# Patient Record
Sex: FEMALE | Race: WHITE | NOT HISPANIC OR LATINO | Employment: FULL TIME | URBAN - METROPOLITAN AREA
[De-identification: names, ages, dates, MRNs, and addresses within clinical notes are randomized per-mention and may not be internally consistent; named-entity substitution may affect disease eponyms.]

---

## 2021-04-16 ENCOUNTER — BULK ORDERING (OUTPATIENT)
Dept: CASE MANAGEMENT | Facility: OTHER | Age: 36
End: 2021-04-16

## 2021-04-16 DIAGNOSIS — Z23 IMMUNIZATION DUE: ICD-10-CM

## 2021-05-10 ENCOUNTER — TREATMENT (OUTPATIENT)
Dept: PHYSICAL THERAPY | Facility: CLINIC | Age: 36
End: 2021-05-10

## 2021-05-10 DIAGNOSIS — M25.559 PAIN, HIP: ICD-10-CM

## 2021-05-10 DIAGNOSIS — G89.29 CHRONIC BILATERAL LOW BACK PAIN WITHOUT SCIATICA: ICD-10-CM

## 2021-05-10 DIAGNOSIS — M79.18 MYALGIA OF PELVIC FLOOR: Primary | ICD-10-CM

## 2021-05-10 DIAGNOSIS — N39.46 MIXED INCONTINENCE: ICD-10-CM

## 2021-05-10 DIAGNOSIS — M54.50 CHRONIC BILATERAL LOW BACK PAIN WITHOUT SCIATICA: ICD-10-CM

## 2021-05-10 PROCEDURE — 97530 THERAPEUTIC ACTIVITIES: CPT | Performed by: PHYSICAL THERAPIST

## 2021-05-10 PROCEDURE — 97162 PT EVAL MOD COMPLEX 30 MIN: CPT | Performed by: PHYSICAL THERAPIST
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
## 2021-05-17 ENCOUNTER — TREATMENT (OUTPATIENT)
Dept: PHYSICAL THERAPY | Facility: CLINIC | Age: 36
End: 2021-05-17

## 2021-05-17 DIAGNOSIS — G89.29 CHRONIC BILATERAL LOW BACK PAIN WITHOUT SCIATICA: ICD-10-CM

## 2021-05-17 DIAGNOSIS — N39.46 MIXED INCONTINENCE: ICD-10-CM

## 2021-05-17 DIAGNOSIS — M79.18 MYALGIA OF PELVIC FLOOR: Primary | ICD-10-CM

## 2021-05-17 DIAGNOSIS — M25.559 PAIN, HIP: ICD-10-CM

## 2021-05-17 DIAGNOSIS — M54.50 CHRONIC BILATERAL LOW BACK PAIN WITHOUT SCIATICA: ICD-10-CM

## 2021-05-17 PROCEDURE — 97140 MANUAL THERAPY 1/> REGIONS: CPT | Performed by: PHYSICAL THERAPIST

## 2021-05-17 PROCEDURE — 97032 APPL MODALITY 1+ESTIM EA 15: CPT | Performed by: PHYSICAL THERAPIST

## 2021-05-17 PROCEDURE — 97112 NEUROMUSCULAR REEDUCATION: CPT | Performed by: PHYSICAL THERAPIST

## 2021-05-17 PROCEDURE — 97110 THERAPEUTIC EXERCISES: CPT | Performed by: PHYSICAL THERAPIST

## 2021-05-17 NOTE — PROGRESS NOTES
Physical Therapy Daily Progress Note      Patient: Anjali Silva   : 1985  Referring practitioner: Usha Cardoso*  Date of Initial Visit: Type: THERAPY  Noted: 5/10/2021  Today's Date: 2021  Patient seen for 2 sessions    Progress Note Due: 2021     Anjali Silva reports: that she was fine the day after the eval, but sore the following day.       Objective/ Treatment: Mild tenderness of B superficial pelvic floor muscles. Moderate-tenderness of B erector spinae and glute medius with increased resting activity of the left spinal extensors. Min-moderate verbal cues and demo provided for new exercises. Emphasis on minimizing valsalva and coordinating breath with pelvic floor elongation. See manual therapy, exercise, and modality logs for more details.     Education: Updated home exercise program   Lower Trunk Rotations - 1 x daily - 7 x weekly - 1 sets - 30 reps  Standing Anti-Rotation Press with Anchored Resistance - 1 x daily - 7 x weekly - 2 sets - 15 reps  Wall Bunkie - 1 x daily - 7 x weekly - 1 sets - 15 reps - 3 sec hold  Seated Cat Camel - 1 x daily - 7 x weekly - 1 sets - 15 reps - 3 sec hold      Assessment: The patient tolerated today's second therapy session well. Initiated manual therapy to decrease muscle over activity within the pelvic floor and lumbar spine. Progressed lumbopelvic mobility exercises and trialed IFC electrical stimulation and moist heat for pain relief.         Plan:  Progress per Plan of Care             Timed:         Manual Therapy:  19       mins  30334;     Therapeutic Exercise:   8      mins  26582;     Neuromuscular Carolina:  8      mins  97161;    Therapeutic Activity:          mins  70608;     Gait Training:           mins  30143;     Ultrasound:          mins  42928;    Ionto                                   mins   30244  Self Care                            mins   20225      Un-Timed:  Electrical Stimulation:   20      mins  39792 ( );  Dry  Needling          mins self-pay  Traction          mins 15830  Low Eval          Mins  61883  Mod Eval          Mins  40097  High Eval                            Mins  52462  Canalith Repos                   mins  98602    Timed Treatment:  55    mins   Total Treatment:    57    mins    Janna Jorge, PT  Physical Therapist

## 2021-05-21 ENCOUNTER — TREATMENT (OUTPATIENT)
Dept: PHYSICAL THERAPY | Facility: CLINIC | Age: 36
End: 2021-05-21

## 2021-05-21 DIAGNOSIS — M25.559 PAIN, HIP: ICD-10-CM

## 2021-05-21 DIAGNOSIS — M54.50 CHRONIC BILATERAL LOW BACK PAIN WITHOUT SCIATICA: ICD-10-CM

## 2021-05-21 DIAGNOSIS — M79.18 MYALGIA OF PELVIC FLOOR: Primary | ICD-10-CM

## 2021-05-21 DIAGNOSIS — G89.29 CHRONIC BILATERAL LOW BACK PAIN WITHOUT SCIATICA: ICD-10-CM

## 2021-05-21 DIAGNOSIS — N39.46 MIXED INCONTINENCE: ICD-10-CM

## 2021-05-21 PROCEDURE — 97032 APPL MODALITY 1+ESTIM EA 15: CPT | Performed by: PHYSICAL THERAPIST

## 2021-05-21 PROCEDURE — 97112 NEUROMUSCULAR REEDUCATION: CPT | Performed by: PHYSICAL THERAPIST

## 2021-05-21 PROCEDURE — 97140 MANUAL THERAPY 1/> REGIONS: CPT | Performed by: PHYSICAL THERAPIST

## 2021-05-21 PROCEDURE — 97110 THERAPEUTIC EXERCISES: CPT | Performed by: PHYSICAL THERAPIST

## 2021-05-21 NOTE — PROGRESS NOTES
Physical Therapy Daily Progress Note      Patient: Anjali Silva   : 1985  Referring practitioner: Usha Cardoso*  Date of Initial Visit: Type: THERAPY  Noted: 5/10/2021  Today's Date: 2021  Patient seen for 3 sessions    Progress Note Due: 2021     Anjali Silva reports: that she was sore after last session but it was a good hurt. Shonto that the heat and stim were beneficial. Pelvic clocks difficult to perform counter clockwise.       Objective/ Treatment: Mild discomfort and hypertonicity of the superficial transverse perineal musculature. Small area of bruising on left buttock, which was avoided today. Hypertonicity of the left erector spinae compared to the right. Minimal verbal cues for addition of new exercises to maintain neutral pelvic positions. See manual therapy and exercise logs for more details.      Education: Updated home exercise program with handouts     Exercises  Quadruped Full Range Thoracic Rotation with Reach - 1 x daily - 7 x weekly - 1 sets - 10 reps  Deep Squat with Pelvic Floor Relaxation - 1 x daily - 7 x weekly - 1 sets - 1 min hold      Assessment: The patient tolerated today's session well with minimal complaints of pain. Progressed pelvic floor down training, lumbar mobility and rotational strengthening to decrease pelvic and back pain. Plan to progress lumbopelvic stability exercises as pain allows.         Plan:  Progress per Plan of Care             Timed:         Manual Therapy:   20      mins  79643;     Therapeutic Exercise:   8      mins  07931;     Neuromuscular Carolina: 17       mins  92977;    Therapeutic Activity:          mins  94974;     Gait Training:           mins  79747;     Ultrasound:          mins  05149;    Ionto                                   mins   79224  Self Care                            mins   41831      Un-Timed:  Electrical Stimulation:   20      mins  71859 ( );  Dry Needling          mins self-pay  Traction           mins 11483  Low Eval          Mins  37110  Mod Eval          Mins  01961  High Eval                            Mins  12108  Canalith Repos                   mins  80059    Timed Treatment:  65    mins   Total Treatment:    70   mins    Janna Jorge, PT  Physical Therapist

## 2021-05-24 ENCOUNTER — TREATMENT (OUTPATIENT)
Dept: PHYSICAL THERAPY | Facility: CLINIC | Age: 36
End: 2021-05-24

## 2021-05-24 DIAGNOSIS — M54.50 CHRONIC BILATERAL LOW BACK PAIN WITHOUT SCIATICA: ICD-10-CM

## 2021-05-24 DIAGNOSIS — N39.46 MIXED INCONTINENCE: ICD-10-CM

## 2021-05-24 DIAGNOSIS — G89.29 CHRONIC BILATERAL LOW BACK PAIN WITHOUT SCIATICA: ICD-10-CM

## 2021-05-24 DIAGNOSIS — M25.559 PAIN, HIP: ICD-10-CM

## 2021-05-24 DIAGNOSIS — M79.18 MYALGIA OF PELVIC FLOOR: Primary | ICD-10-CM

## 2021-05-24 PROCEDURE — 97014 ELECTRIC STIMULATION THERAPY: CPT | Performed by: PHYSICAL THERAPIST

## 2021-05-24 PROCEDURE — 97112 NEUROMUSCULAR REEDUCATION: CPT | Performed by: PHYSICAL THERAPIST

## 2021-05-24 PROCEDURE — 97140 MANUAL THERAPY 1/> REGIONS: CPT | Performed by: PHYSICAL THERAPIST

## 2021-05-24 PROCEDURE — 97110 THERAPEUTIC EXERCISES: CPT | Performed by: PHYSICAL THERAPIST

## 2021-05-24 NOTE — PROGRESS NOTES
Physical Therapy Daily Progress Note      Patient: Anjali Silva   : 1985  Referring practitioner: No ref. provider found  Date of Initial Visit: Type: THERAPY  Noted: 5/10/2021  Today's Date: 2021  Patient seen for 4 sessions    Progress Note Due: 2021     Anjali Silva reports: that she worked at a different location and noticed that she was leaning over to the side a lot reaching for the hair bowls. Notices that since she's started physical therapy hasn't noticed her shoulder hurting as much when she reaches.       Objective/ Treatment:   The patient verbally provided ongoing and enthusiastic consent for the internal pelvic floor assessment and treatment conducting during today's physical therapy session.    Moderate discomfort with palpation of superficial pelvic floor muscles. Overactivity diminished towards the end of manual intervention. Hypertonicity of the left erector spinae compared to the right. Minimal verbal cues provided for addition of new exercises. Patient rotating well through the spine with minimal verbal/tactile cues. See manual therapy, exercise, and treatment logs for more details.      Education: Updated home exercise program       Assessment: The patient tolerated today's session well. Pelvic floor hypertonicity is slowly decreasing. Progressing spinal mobility exercises and planning to continue lumbar strengthening.          Plan:  Progress per Plan of Care             Timed:         Manual Therapy:  18       mins  95781;     Therapeutic Exercise:   10      mins  99807;     Neuromuscular Carolina: 17       mins  87086;    Therapeutic Activity:          mins  69542;     Gait Training:           mins  14392;     Ultrasound:          mins  10782;    Ionto                                   mins   35259  Self Care                            mins   98305      Un-Timed:  Electrical Stimulation:    20     mins  66532 ( );  Dry Needling          mins self-pay  Traction           mins 74790  Low Eval          Mins  66997  Mod Eval          Mins  93835  High Eval                            Mins  29584  Canalith Repos                   mins  65723    Timed Treatment:  65    mins   Total Treatment:     65   mins    Janna Jorge, PT  Physical Therapist

## 2021-05-26 ENCOUNTER — TREATMENT (OUTPATIENT)
Dept: PHYSICAL THERAPY | Facility: CLINIC | Age: 36
End: 2021-05-26

## 2021-05-26 DIAGNOSIS — M79.18 MYALGIA OF PELVIC FLOOR: Primary | ICD-10-CM

## 2021-05-26 DIAGNOSIS — G89.29 CHRONIC BILATERAL LOW BACK PAIN WITHOUT SCIATICA: ICD-10-CM

## 2021-05-26 DIAGNOSIS — M54.50 CHRONIC BILATERAL LOW BACK PAIN WITHOUT SCIATICA: ICD-10-CM

## 2021-05-26 DIAGNOSIS — M25.559 PAIN, HIP: ICD-10-CM

## 2021-05-26 DIAGNOSIS — N39.46 MIXED INCONTINENCE: ICD-10-CM

## 2021-05-26 PROCEDURE — 97140 MANUAL THERAPY 1/> REGIONS: CPT | Performed by: PHYSICAL THERAPIST

## 2021-05-26 PROCEDURE — 97112 NEUROMUSCULAR REEDUCATION: CPT | Performed by: PHYSICAL THERAPIST

## 2021-05-26 PROCEDURE — 97032 APPL MODALITY 1+ESTIM EA 15: CPT | Performed by: PHYSICAL THERAPIST

## 2021-05-26 PROCEDURE — 97110 THERAPEUTIC EXERCISES: CPT | Performed by: PHYSICAL THERAPIST

## 2021-05-26 NOTE — PROGRESS NOTES
Physical Therapy Daily Progress Note      Patient: Anjali Silva   : 1985  Referring practitioner: Usha Cardoso*  Date of Initial Visit: Type: THERAPY  Noted: 5/10/2021  Today's Date: 2021  Patient seen for 5 sessions    Progress Note Due: 2021     Anjali Silva reports: that she was sore after she left, but is feeling better now. Feels like she's still been straining with bowel movements and not emptying her bladder all the way.     Current Pain: 4/10  Worst Pain: 8-10/10 (low back while lifting/carrying)        Subjective Questionnaire: Pelvic Floor Impact Questionnaire (raw scores):  Bladder or Urine:  = 57.1  Bowel or Rectum:  = 52.3  Vagina or Pelvis:  = 52.3      Objective/ Treatment:    Palpation   Left   Hypertonic in the erector spinae.   Tenderness of the erector spinae, gluteus guera, gluteus medius, iliacus, iliopsoas and quadratus lumborum.      Right   Hypertonic in the erector spinae. Tenderness of the erector spinae, gluteus guera, gluteus medius, iliacus, iliopsoas and quadratus lumborum.      Left Hip Palpation Comments  Quadratus lumborum: L>R.     Additional Palpation Details  Moderate tenderness: Bilateral pectineus   Mild tenderness: Superficial transverse perineal, left bulbocavernosus      Tenderness      Lumbar Spine  Tenderness in the facet joint.      Left Hip   Tenderness in the PSIS and sacroiliac joint.      Right Hip   Tenderness in the PSIS and sacroiliac joint.     Additional Tenderness Details  Moderate tenderness: R L4-5, L L1-2     Active Range of Motion      Lumbar   Flexion: WFL  Extension: WFL with pain  Left lateral flexion: WFL  Right lateral flexion: 30 degrees   Left rotation: WFL  Right rotation: WFL       Strength/Myotome Testing      Left Hip   Planes of Motion   Extension: 4+  Abduction: 5  External rotation: 4+  Internal rotation: 4+     Isolated Muscles   Gluteus guera: 4+  Gluteus minimus: 4+     Right Hip   Planes of  Motion   Extension: 4+  Abduction: 5  External rotation: 5  Internal rotation: 5     Isolated Muscles   Gluteus maximums: 4+  Gluteus minimus: 4+    Additional Strength Details  The patient verbally provided ongoing and enthusiastic consent for the internal pelvic floor assessment and treatment conducting during today's physical therapy session.  Pelvic Floor Muscle Strength: 3/5  Pelvic Floor Muscle Endurance: 10 seconds  Pelvic Floor Muscle Power: 5 contractions in 10 seconds  Pelvic Floor Relaxation: Delayed, but present      Muscle Activation   Patient unable to activate left transverse abdominals and right transverse abdominals.      Tests      Lumbar  Left   Positive quadrant.      Left Pelvic Girdle/Sacrum   Positive: sacral spring.        Right Pelvic Girdle/Sacrum   Positive: sacral spring.       Additional Tests Details  Evidence of grade 1 pelvic organ prolapse      Education: Progress towards goals      Short Term Goals: 4 weeks  1. The patient will be compliant with home exercise program with minimal verbal and/or tactile cues. -achieved   2. The patient will deny restriction with lifting/carrying light objects at work such as shampoo. -progressing, 3/10   3. The patient will be independent with appropriate defecation pattern and be able to elongate the pelvic floor fully. -progressing, nearly achieved          Long Term Goals: 12 weeks   1. The patient will be independent with long term home exercise program. -progressing  2. The patient will perform prolonged standing during a work shift with < 2/10 low back and hip pain. -progressing 8/10  3. The patient will deny pelvic pressure during recreational resistance training and yoga. -progressing   4. The patient will report a 75% decrease in episodes of incontinence.  -progressing   5. Pelvic Floor Impact Questionnaire items to decrease to < 50 to return to physical activities, social activities and household chores without limitation. -progressing,  nearly achieved     Assessment: The patient has attended 5 sessions of physical therapy thus far. Compliance has been excellent. Upon re-assessment today, the patient exhibits improvements in right sided pelvic floor muscles tone and glute minimus strength. Subjective outcome measure has improved in all categories. She continues to exhibit overactivity of the left sided pelvic floor musculature and erector spinae contributing to back pain with carrying items and standing for prolonged periods at work and pelvic pain with gynecological exams, mixed incontinence and straining with bowel movements. She would benefit from additional skilled physical therapy to address the stated deficits and return to her previous level of function.           Plan:  Progress per Plan of Care             Timed:         Manual Therapy:   18      mins  59700;     Therapeutic Exercise:    11     mins  47445;     Neuromuscular Carolina:  11      mins  64380;    Therapeutic Activity:          mins  48437;     Gait Training:           mins  56807;     Ultrasound:          mins  32582;    Ionto                                   mins   17789  Self Care                            mins   58771      Un-Timed:  Electrical Stimulation:   20      mins  14993 (MC );  Dry Needling          mins self-pay  Traction          mins 59852  Low Eval          Mins  68518  Mod Eval          Mins  15105  High Eval                            Mins  65641  Canalith Repos                   mins  06922    Timed Treatment:  60    mins   Total Treatment:    62    mins    Janna Jorge, PT  Physical Therapist

## 2021-06-02 ENCOUNTER — TREATMENT (OUTPATIENT)
Dept: PHYSICAL THERAPY | Facility: CLINIC | Age: 36
End: 2021-06-02

## 2021-06-02 DIAGNOSIS — M25.559 PAIN, HIP: ICD-10-CM

## 2021-06-02 DIAGNOSIS — G89.29 CHRONIC BILATERAL LOW BACK PAIN WITHOUT SCIATICA: ICD-10-CM

## 2021-06-02 DIAGNOSIS — M54.50 CHRONIC BILATERAL LOW BACK PAIN WITHOUT SCIATICA: ICD-10-CM

## 2021-06-02 DIAGNOSIS — M79.18 MYALGIA OF PELVIC FLOOR: Primary | ICD-10-CM

## 2021-06-02 DIAGNOSIS — N39.46 MIXED INCONTINENCE: ICD-10-CM

## 2021-06-02 PROCEDURE — 97112 NEUROMUSCULAR REEDUCATION: CPT | Performed by: PHYSICAL THERAPIST

## 2021-06-02 PROCEDURE — 97032 APPL MODALITY 1+ESTIM EA 15: CPT | Performed by: PHYSICAL THERAPIST

## 2021-06-02 PROCEDURE — 97140 MANUAL THERAPY 1/> REGIONS: CPT | Performed by: PHYSICAL THERAPIST

## 2021-06-02 NOTE — PROGRESS NOTES
Physical Therapy Daily Progress Note      Patient: Anjali Silva   : 1985  Referring practitioner: Usha Cardoso*  Date of Initial Visit: Type: THERAPY  Noted: 5/10/2021  Today's Date: 2021  Patient seen for 6 sessions    Progress Note Due: 2021     Anjali Silva reports: that she rode bikes all day Monday and now she's really sore.       Objective/ Treatment: Mild discomfort with palpation of left superficial pelvic floor muscles. The erector spinae and gluteals are tender bilaterally, but less muscle overactivity noted compared to previous visits. The patient self employs breathing strategies during exercises without cues. Verbal/tactile cues to maintain appropriate posterior pelvic tilt in standing. See manual therapy, exercise and modalities logs for more details.     Education: Updated home exercise program with handouts      Assessment: Continuing to progress abdominal activation and neuromuscular in standing. The patient exhibits difficulty maintaining posterior pelvic tilt, likely due to abdominal weakness. Plan to progress to dynamic strengthening in standing once patient masters activation in static positions.         Plan:  Progress per Plan of Care             Timed:         Manual Therapy:  14      mins  85421;     Therapeutic Exercise:         mins  61770;     Neuromuscular Carolina:  31      mins  12710;    Therapeutic Activity:          mins  03759;     Gait Training:           mins  99206;     Ultrasound:          mins  89224;    Ionto                                   mins   62847  Self Care                            mins   19529      Un-Timed:  Electrical Stimulation:   20      mins  96571 ( );  Dry Needling          mins self-pay  Traction          mins 96728  Low Eval          Mins  10419  Mod Eval          Mins  86322  High Eval                            Mins  20827  Canalith Repos                   mins  04195    Timed Treatment:  65    mins   Total Treatment:     66    mins    Janna Jorge, PT  Physical Therapist

## 2021-06-04 ENCOUNTER — TREATMENT (OUTPATIENT)
Dept: PHYSICAL THERAPY | Facility: CLINIC | Age: 36
End: 2021-06-04

## 2021-06-04 DIAGNOSIS — M25.559 PAIN, HIP: ICD-10-CM

## 2021-06-04 DIAGNOSIS — G89.29 CHRONIC BILATERAL LOW BACK PAIN WITHOUT SCIATICA: ICD-10-CM

## 2021-06-04 DIAGNOSIS — N39.46 MIXED INCONTINENCE: ICD-10-CM

## 2021-06-04 DIAGNOSIS — M54.50 CHRONIC BILATERAL LOW BACK PAIN WITHOUT SCIATICA: ICD-10-CM

## 2021-06-04 DIAGNOSIS — M79.18 MYALGIA OF PELVIC FLOOR: Primary | ICD-10-CM

## 2021-06-04 PROCEDURE — 97112 NEUROMUSCULAR REEDUCATION: CPT | Performed by: PHYSICAL THERAPIST

## 2021-06-04 PROCEDURE — 97140 MANUAL THERAPY 1/> REGIONS: CPT | Performed by: PHYSICAL THERAPIST

## 2021-06-04 PROCEDURE — 97014 ELECTRIC STIMULATION THERAPY: CPT | Performed by: PHYSICAL THERAPIST

## 2021-06-04 NOTE — PROGRESS NOTES
Physical Therapy Daily Progress Note      Patient: Anjali Silva   : 1985  Referring practitioner: Usha Cardoso*  Date of Initial Visit: Type: THERAPY  Noted: 5/10/2021  Today's Date: 2021  Patient seen for 7 sessions    Progress Note Due: 2021     Anjali Silva reports: that she's been trying to be more conscious of standing with a tilt at work and it helps.       Objective/ Treatment: Mild discomfort with palpation of left superficial pelvic floor muscles. The erector spinae and gluteals are tender bilaterally, but still decreased muscle tone compared to last session. She is able to achieve good thoracic motion with wall rosetta exercise. Minimal verbal cues to maintain posterior pelvic tilt. See manual therapy, modality, and exercise logs for more details.     Education: Demo on how to perform deadlift motion with PPT      Assessment: The patient was able to progress resistance associated with rotational spinal strengthening. Today's session was slightly shorter than normal, as the patient had to be at work earlier. As a result, not all exercises were performed. Plan to progress dynamic standing lumbopelvic strengthening next session.        Plan:  Progress strengthening /stabilization /functional activity             Timed:         Manual Therapy:   16      mins  14201;     Therapeutic Exercise:         mins  27966;     Neuromuscular Carolina:  16      mins  13694;    Therapeutic Activity:    2      mins  49910;     Gait Training:           mins  58012;     Ultrasound:          mins  98933;    Ionto                                   mins   03875  Self Care                            mins   44635      Un-Timed:  Electrical Stimulation:   10      mins  18075 ( );  Dry Needling          mins self-pay  Traction          mins 59020  Low Eval          Mins  17830  Mod Eval          Mins  92765  High Eval                            Mins  55078  Canalith Repos                   mins   53437    Timed Treatment:  44    mins   Total Treatment:    45    mins    Janna Jorge, PT  Physical Therapist

## 2021-06-07 ENCOUNTER — TREATMENT (OUTPATIENT)
Dept: PHYSICAL THERAPY | Facility: CLINIC | Age: 36
End: 2021-06-07

## 2021-06-07 DIAGNOSIS — M54.50 CHRONIC BILATERAL LOW BACK PAIN WITHOUT SCIATICA: ICD-10-CM

## 2021-06-07 DIAGNOSIS — N39.46 MIXED INCONTINENCE: ICD-10-CM

## 2021-06-07 DIAGNOSIS — G89.29 CHRONIC BILATERAL LOW BACK PAIN WITHOUT SCIATICA: ICD-10-CM

## 2021-06-07 DIAGNOSIS — M79.18 MYALGIA OF PELVIC FLOOR: Primary | ICD-10-CM

## 2021-06-07 DIAGNOSIS — M25.559 PAIN, HIP: ICD-10-CM

## 2021-06-07 PROCEDURE — 97112 NEUROMUSCULAR REEDUCATION: CPT | Performed by: PHYSICAL THERAPIST

## 2021-06-07 PROCEDURE — 97140 MANUAL THERAPY 1/> REGIONS: CPT | Performed by: PHYSICAL THERAPIST

## 2021-06-07 PROCEDURE — 97032 APPL MODALITY 1+ESTIM EA 15: CPT | Performed by: PHYSICAL THERAPIST

## 2021-06-07 NOTE — PROGRESS NOTES
Physical Therapy Daily Progress Note      Patient: Anjali Silva   : 1985  Referring practitioner: No ref. provider found  Date of Initial Visit: Type: THERAPY  Noted: 5/10/2021  Today's Date: 2021  Patient seen for 8 sessions    Progress Note Due: 2021     Anjali Silva reports: that she went bike riding Saturday. Butt is sore. Feels like she is able to empty her bladder better now. Still experiences pelvic pressure but hasn't noticed leaking.       Objective/ Treatment: Mild discomfort with palpation of left sided pelvic floor musculature. Overall, pelvic floor muscle tone is decreasing. The posterior buttocks were moderate-severely tender today bilaterally L>R. Moderate verbal cues provided for progression of exercises including maintaining posterior pelvic tilt in mini lunge positions. Cues to minimize genu valgum. Patient performed exercises in front of mirror for feedback. See manual therapy and exercise logs for more details.     Education: Updated HEP      Assessment: Progressed lumbopelvic stabilization exercises today to more dynamic positions. 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         Plan:  Progress strengthening /stabilization /functional activity             Timed:         Manual Therapy:  16       mins  85461;     Therapeutic Exercise:         mins  01370;     Neuromuscular Carolina:  13      mins  68516;    Therapeutic Activity:    8      mins  78526;     Gait Training:           mins  11134;     Ultrasound:          mins  60554;    Ionto                                   mins   58002  Self Care                            mins   62372      Un-Timed:  Electrical Stimulation:  10       mins  29438 ( );  Dry Needling          mins  self-pay  Traction          mins 72885  Low Eval          Mins  61762  Mod Eval          Mins  18841  High Eval                            Mins  17601  Canalith Repos                   mins  10208    Timed Treatment:  47    mins   Total Treatment:    49    mins    Janna Jorge, PT  Physical Therapist

## 2021-06-09 ENCOUNTER — TREATMENT (OUTPATIENT)
Dept: PHYSICAL THERAPY | Facility: CLINIC | Age: 36
End: 2021-06-09

## 2021-06-09 DIAGNOSIS — M25.559 PAIN, HIP: ICD-10-CM

## 2021-06-09 DIAGNOSIS — G89.29 CHRONIC BILATERAL LOW BACK PAIN WITHOUT SCIATICA: ICD-10-CM

## 2021-06-09 DIAGNOSIS — N39.46 MIXED INCONTINENCE: ICD-10-CM

## 2021-06-09 DIAGNOSIS — M79.18 MYALGIA OF PELVIC FLOOR: Primary | ICD-10-CM

## 2021-06-09 DIAGNOSIS — M54.50 CHRONIC BILATERAL LOW BACK PAIN WITHOUT SCIATICA: ICD-10-CM

## 2021-06-09 PROCEDURE — 97140 MANUAL THERAPY 1/> REGIONS: CPT | Performed by: PHYSICAL THERAPIST

## 2021-06-09 PROCEDURE — 97032 APPL MODALITY 1+ESTIM EA 15: CPT | Performed by: PHYSICAL THERAPIST

## 2021-06-09 PROCEDURE — 97530 THERAPEUTIC ACTIVITIES: CPT | Performed by: PHYSICAL THERAPIST

## 2021-06-09 PROCEDURE — 97110 THERAPEUTIC EXERCISES: CPT | Performed by: PHYSICAL THERAPIST

## 2021-06-09 NOTE — PROGRESS NOTES
Physical Therapy Daily Progress Note      Patient: Anjali Silva   : 1985  Referring practitioner: No ref. provider found  Date of Initial Visit: Type: THERAPY  Noted: 5/10/2021  Today's Date: 2021  Patient seen for 9 sessions    Progress Note Due: 2021     Anjali Silva reports: that her back was fine but she had some pain in her pelvis. Went for a run the other day and tried to keep her pelvis level. Feels like it is also easier to lay on her stomach.       Objective/ Treatment:  The patient verbally provided ongoing and enthusiastic consent for the internal pelvic floor assessment and treatment conducting during today's physical therapy session.    Mild discomfort of left pelvic floor musculature - slightly decreased from previous visits. The QL and erector spinae are hypertonic and moderately-severely painful to palpation. Provided minimal verbal cues for correct lunge position including stance. Moderate verbal cues for squat form including minimizing excessive trunk flexion. See manual therapy, exercise, and modality logs for more details.     Education: Benefit of dry needling, proper squat form       Assessment: The patient exhibited slightly less discomfort with pelvic floor soft tissue mobilization. We are progressing her lumbopelvic strengthening to minimize back pain. The patient exhibits excessive forward trunk flexion during squat. The patient benefits from verbal cues to correct, although with difficulty. Discussed the benefits of dry needling. Plan to schedule dry needling appointment and continue with dynamic lumbopelvic strengthening.         Plan:  Progress per Plan of Care             Timed:         Manual Therapy:   15      mins  43508;     Therapeutic Exercise:   18      mins  92101;     Neuromuscular Carolina:        mins  18129;    Therapeutic Activity:    13      mins  15298;     Gait Training:           mins  93012;     Ultrasound:          mins  57927;    Ionto                                    mins   11606  Self Care                            mins   41342      Un-Timed:  Electrical Stimulation:   10      mins  98643 ( );  Dry Needling          mins self-pay  Traction          mins 06636  Low Eval          Mins  59902  Mod Eval          Mins  22642  High Eval                            Mins  58782  Canalith Repos                   mins  22079    Timed Treatment:  56    mins   Total Treatment:    58    mins    Janna Jorge, PT  Physical Therapist

## 2021-06-16 ENCOUNTER — TREATMENT (OUTPATIENT)
Dept: PHYSICAL THERAPY | Facility: CLINIC | Age: 36
End: 2021-06-16

## 2021-06-16 DIAGNOSIS — G89.29 CHRONIC BILATERAL LOW BACK PAIN WITHOUT SCIATICA: ICD-10-CM

## 2021-06-16 DIAGNOSIS — M25.559 PAIN, HIP: ICD-10-CM

## 2021-06-16 DIAGNOSIS — M54.50 CHRONIC BILATERAL LOW BACK PAIN WITHOUT SCIATICA: ICD-10-CM

## 2021-06-16 DIAGNOSIS — M79.18 MYALGIA OF PELVIC FLOOR: Primary | ICD-10-CM

## 2021-06-16 DIAGNOSIS — N39.46 MIXED INCONTINENCE: ICD-10-CM

## 2021-06-16 PROCEDURE — 97110 THERAPEUTIC EXERCISES: CPT | Performed by: PHYSICAL THERAPIST

## 2021-06-16 PROCEDURE — 97140 MANUAL THERAPY 1/> REGIONS: CPT | Performed by: PHYSICAL THERAPIST

## 2021-06-16 PROCEDURE — 97032 APPL MODALITY 1+ESTIM EA 15: CPT | Performed by: PHYSICAL THERAPIST

## 2021-06-16 PROCEDURE — 97530 THERAPEUTIC ACTIVITIES: CPT | Performed by: PHYSICAL THERAPIST

## 2021-06-16 NOTE — PROGRESS NOTES
Physical Therapy Daily Progress Note      Patient: Anjali Silva   : 1985  Referring practitioner: No ref. provider found  Date of Initial Visit: Type: THERAPY  Noted: 5/10/2021  Today's Date: 2021  Patient seen for 10 sessions    Progress Note Due: 2021     Anjali Silva reports: that she's noticed some sharp pains in the front of her hips. Always assumed it was ovaries, but thinks it might be psoas muscles. Sneezing with coughing and leaking. Bowels have been moving well. Feels like she's more aware of how she should be standing at work, but back is always painful at the end of a shift. Feels like she's had less hip pain with sleeping and less hip pain with running.     Subjective Questionnaire: Pelvic Floor Impact Questionnaire (raw scores):  Bladder or Urine:  = 52.3 .  Bowel or Rectum:  = 52.3  Vagina or Pelvis:  = 66.6       Current Pain: 4/10  Worst Pain: 8/10 (low back while lifting/carrying)        Objective/ Treatment:  Left   Tenderness of the erector spinae, gluteus guera, gluteus medius, iliacus, iliopsoas and quadratus lumborum.      Right   Tenderness of the erector spinae, gluteus guera, gluteus medius, iliacus, iliopsoas and quadratus lumborum.      Additional Palpation Details  Moderate tenderness: Bilateral pectineus, Superficial transverse perineal, bilateral obturator internus (palpated internally)    Tenderness      Lumbar Spine  Tenderness in the facet joint.      Left Hip   Tenderness in the PSIS and sacroiliac joint. L>R     Right Hip   Tenderness in the PSIS and sacroiliac joint. L>R    Additional Tenderness Details  Moderate tenderness: R L4-5, L L1-2     Active Range of Motion      Lumbar   Flexion: WFL  Extension: WFL with mild pain  Left lateral flexion: WFL  Right lateral flexion: WFL with mild pain  Left rotation: WFL  Right rotation: WFL        Strength/Myotome Testing      Left Hip   Planes of Motion   Extension: 4+  Abduction: 5  External  rotation: 4+  Internal rotation: 4+     Isolated Muscles   Gluteus guera: 4+  Gluteus minimus: 4+     Right Hip   Planes of Motion   Extension: 4+  Abduction: 5  External rotation: 5  Internal rotation: 5     Isolated Muscles   Gluteus maximums: 4+  Gluteus minimus: 4+    Additional Strength Details  The patient verbally provided ongoing and enthusiastic consent for the internal pelvic floor assessment and treatment conducting during today's physical therapy session.  Pelvic Floor Muscle Strength: 3/5  Pelvic Floor Muscle Endurance: 15 seconds  Pelvic Floor Muscle Power: 6 contractions in 10 seconds  Pelvic Floor Relaxation: Full      Muscle Activation   The patient is able to activate left transverse abdominals and right transverse abdominals in hooklying.      Tests      Lumbar  Right   Positive quadrant.       Education: Progress towards goals      Short Term Goals: 4 weeks  1. The patient will be compliant with home exercise program with minimal verbal and/or tactile cues. -achieved   2. The patient will deny restriction with lifting/carrying light objects at work such as shampoo. -progressing, 3/10   3. The patient will be independent with appropriate defecation pattern and be able to elongate the pelvic floor fully. -achieved    Long Term Goals: 12 weeks   1. The patient will be independent with long term home exercise program. -progressing  2. The patient will perform prolonged standing during a work shift with < 2/10 low back and hip pain. -progressing 8/10  3. The patient will deny pelvic pressure during recreational resistance training and yoga. -progressing   4. The patient will report a 75% decrease in episodes of incontinence.  -progressing   5. Pelvic Floor Impact Questionnaire items to decrease to < 50 to return to physical activities, social activities and household chores without limitation. -progressing, nearly achieved     Assessment: The patient has attended 10 sessions of skilled physical  therapy thus far. Compliance has been good. Since beginning physical therapy, she exhibits a reduction in left QL and erector spinae muscle tone. Spinal mobility has improved. Pelvic floor muscle endurance has also improved. 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 She would benefit from additional skilled physical therapy in order to address the stated deficits and return to her previous level of function.         Plan:  Progress strengthening /stabilization /functional activity             Timed:         Manual Therapy:  15       mins  68711;     Therapeutic Exercise:    10     mins  14260;     Neuromuscular Carolina:        mins  12589;    Therapeutic Activity:   15       mins  58801;     Gait Training:           mins  50931;     Ultrasound:          mins  63084;    Ionto                                   mins   59538  Self Care                            mins   36290      Un-Timed:  Electrical Stimulation:   15      mins  10138 ( );  Dry Needling          mins self-pay  Traction          mins 49135  Low Eval          Mins  42788  Mod Eval          Mins  83113  High Eval                            Mins  75499  Canalith Repos                   mins  47965    Timed Treatment:  55    mins   Total Treatment:    57    mins    Janna Jorge, PT  Physical Therapist

## 2021-06-17 ENCOUNTER — TREATMENT (OUTPATIENT)
Dept: PHYSICAL THERAPY | Facility: CLINIC | Age: 36
End: 2021-06-17

## 2021-06-17 DIAGNOSIS — M79.18 MYALGIA OF PELVIC FLOOR: Primary | ICD-10-CM

## 2021-06-17 DIAGNOSIS — G89.29 CHRONIC BILATERAL LOW BACK PAIN WITHOUT SCIATICA: ICD-10-CM

## 2021-06-17 DIAGNOSIS — M54.50 CHRONIC BILATERAL LOW BACK PAIN WITHOUT SCIATICA: ICD-10-CM

## 2021-06-17 DIAGNOSIS — N39.46 MIXED INCONTINENCE: ICD-10-CM

## 2021-06-17 PROCEDURE — DRYNDL PR CUSTOM DRY NEEDLING SELF PAY: Performed by: PHYSICAL THERAPIST

## 2021-06-17 NOTE — PROGRESS NOTES
Physical Therapy Daily Progress Note  11 treatments  Subjective     Anjali Shira reports: here for dry needling, currently having low back pain and seeing pelvic health PT.         Objective   See Exercise, Manual, and Modality Logs for complete treatment.     Soft tissue was assessed at (B) glute medius and paraspinals (lumbar). PT noted point tenderness as well as palpable trigger points within the muslce tissue. On this date patient stated that they would like to undergo a dry needling procedure for the soft tissue dysfunction. Patient was educated on the procedure for dry needling and consent waver was signed. Patient was informed of the risks, possible adverse effects, along with the benefits of TDN.     Assessment/Plan     Patient tolerated all treatment well. LTR was seen in all targeted muscle groups. Patient was educated on techniques to reduce pain/ soreness if needed as well as what symptoms can be expected following this procedure. Will continue to follow this patient and treat as needed.        Progress per Plan of Care           Manual Therapy:         mins  09437;  Therapeutic Exercise:         mins  37011;     Neuromuscular Carolina:        mins  54469;    Therapeutic Activity:          mins  59037;     Gait Training:           mins  05838;     Ultrasound:          mins  92687;    Electrical Stimulation:         mins  71608 ( );  Dry Needling     12     mins self-pay  12 min heat following   Timed Treatment:   12   mins   Total Treatment:     24   mins    Cuba Faria PT DPT  Physical Therapist  [place] License # [id number]

## 2021-06-21 ENCOUNTER — TREATMENT (OUTPATIENT)
Dept: PHYSICAL THERAPY | Facility: CLINIC | Age: 36
End: 2021-06-21

## 2021-06-21 DIAGNOSIS — M79.18 MYALGIA OF PELVIC FLOOR: Primary | ICD-10-CM

## 2021-06-21 DIAGNOSIS — N39.46 MIXED INCONTINENCE: ICD-10-CM

## 2021-06-21 DIAGNOSIS — M25.559 PAIN, HIP: ICD-10-CM

## 2021-06-21 DIAGNOSIS — G89.29 CHRONIC BILATERAL LOW BACK PAIN WITHOUT SCIATICA: ICD-10-CM

## 2021-06-21 DIAGNOSIS — M54.50 CHRONIC BILATERAL LOW BACK PAIN WITHOUT SCIATICA: ICD-10-CM

## 2021-06-21 PROCEDURE — 97530 THERAPEUTIC ACTIVITIES: CPT | Performed by: PHYSICAL THERAPIST

## 2021-06-21 PROCEDURE — 97140 MANUAL THERAPY 1/> REGIONS: CPT | Performed by: PHYSICAL THERAPIST

## 2021-06-21 PROCEDURE — 97032 APPL MODALITY 1+ESTIM EA 15: CPT | Performed by: PHYSICAL THERAPIST

## 2021-06-21 NOTE — PROGRESS NOTES
Physical Therapy Daily Progress Note      Patient: Anjali Silva   : 1985  Referring practitioner: Usha Cardoso*  Date of Initial Visit: Type: THERAPY  Noted: 5/10/2021  Today's Date: 2021  Patient seen for 12 sessions    Progress Note Due: 2021     Anjali Silva reports: that needling session went really well. States back is tender but not really painful. Occasionally moves a certain way and feels like she has to freeze because of pain.       Objective/ Treatment:   The patient verbally provided ongoing and enthusiastic consent for the internal pelvic floor assessment and treatment conducting during today's physical therapy session.    No discomfort with palpation of right obturator internus. The left obturator internus is mildly painful to palpation and the superficial transverse perineal muscle is moderately painful to palpation bilaterally. The erector spinae are mildly tender bilaterally and the QL is moderately tender bilaterally, but mostly on the left side. Gluteals/iliact crest is mildly tender to palpation bilaterally. Moderate verbal cues provided to maintain posterior pelvic tilt with squatting with dowel. Minimal verbal cues to correct speed and upright trunk with lunging. See manual therapy and exercise logs for more details.     Education: Updated HEP      Assessment: The patient exhibits slightly less pain with palpation of right obturator internus and erector spinae today. Initiated specific abdominal and multifidus strengthening and progressed neuromuscular control of the pelvis with forward and backwards walking against resistance.          Plan:  Progress strengthening /stabilization /functional activity             Timed:         Manual Therapy:   14      mins  92575;     Therapeutic Exercise:   2      mins  23134;     Neuromuscular Carolina: 10       mins  02768;    Therapeutic Activity:   10       mins  18145;     Gait Training:           mins  85689;      Ultrasound:          mins  85760;    Ionto                                   mins   89907  Self Care                            mins   86150      Un-Timed:  Electrical Stimulation:  15       mins  28554 ( );  Dry Needling          mins self-pay  Traction          mins 12072  Low Eval          Mins  34647  Mod Eval          Mins  40351  High Eval                            Mins  87868  Canalith Repos                   mins  26105    Timed Treatment:      mins   Total Treatment:        mins    Janna Jorge, PT  Physical Therapist

## 2021-06-23 ENCOUNTER — TREATMENT (OUTPATIENT)
Dept: PHYSICAL THERAPY | Facility: CLINIC | Age: 36
End: 2021-06-23

## 2021-06-23 DIAGNOSIS — N39.46 MIXED INCONTINENCE: ICD-10-CM

## 2021-06-23 DIAGNOSIS — M79.18 MYALGIA OF PELVIC FLOOR: Primary | ICD-10-CM

## 2021-06-23 DIAGNOSIS — G89.29 CHRONIC BILATERAL LOW BACK PAIN WITHOUT SCIATICA: ICD-10-CM

## 2021-06-23 DIAGNOSIS — M54.50 CHRONIC BILATERAL LOW BACK PAIN WITHOUT SCIATICA: ICD-10-CM

## 2021-06-23 DIAGNOSIS — M25.559 PAIN, HIP: ICD-10-CM

## 2021-06-23 PROCEDURE — 97530 THERAPEUTIC ACTIVITIES: CPT | Performed by: PHYSICAL THERAPIST

## 2021-06-23 PROCEDURE — 97032 APPL MODALITY 1+ESTIM EA 15: CPT | Performed by: PHYSICAL THERAPIST

## 2021-06-23 PROCEDURE — 97140 MANUAL THERAPY 1/> REGIONS: CPT | Performed by: PHYSICAL THERAPIST

## 2021-06-23 NOTE — PROGRESS NOTES
Physical Therapy Daily Progress Note      Patient: Anjali Silva   : 1985  Referring practitioner: Usha Cardoso*  Date of Initial Visit: Type: THERAPY  Noted: 5/10/2021  Today's Date: 2021  Patient seen for 13 sessions    Progress Note Due: 2021     Anjali Silva reports: that she was sore after last session.       Objective/ Treatment:  The patient verbally provided ongoing and enthusiastic consent for the internal pelvic floor assessment and treatment conducting during today's physical therapy session.    Primary discomfort with palpation of superior left ischiocavernosus and left OI. The erector spinae are not painful, but the QL is painful (L>R). Patient reports ~5/10 pain with left QL palpation. Moderate verbal cues to maintain PPT during dynamic exercises and for appropriate speed. See manual therapy and exercise logs for more details.     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 She is working towards improving lumopelvic postures during prolonged static positions and with dynamic activities. She continues to benefit from verbal cues for speed and form. Plan to progress abdominal strengthening next session.         Plan:  Progress per Plan of Care             Timed:         Manual Therapy:   16      mins  61458;     Therapeutic Exercise:    2     mins  20617;     Neuromuscular Carolina:        mins  85864;    Therapeutic Activity:   23       mins  20239;     Gait Training:           mins  28467;     Ultrasound:          mins  19988;    Ionto                                   mins   42310  Self Care                            mins   87077      Un-Timed:  Electrical Stimulation:   20      mins  02235 ( );  Dry Needling          mins self-pay  Traction          mins 05112  Low Eval          Mins  45715  Mod Eval          Mins  67978  High Eval                            Mins  08077  Saravanan  Repos                   mins  04405    Timed Treatment:  61    mins   Total Treatment:    64    mins    Janna Jorge, PT  Physical Therapist

## 2021-06-30 ENCOUNTER — TREATMENT (OUTPATIENT)
Dept: PHYSICAL THERAPY | Facility: CLINIC | Age: 36
End: 2021-06-30

## 2021-06-30 DIAGNOSIS — M79.18 MYALGIA OF PELVIC FLOOR: Primary | ICD-10-CM

## 2021-06-30 DIAGNOSIS — M25.559 PAIN, HIP: ICD-10-CM

## 2021-06-30 DIAGNOSIS — M54.50 CHRONIC BILATERAL LOW BACK PAIN WITHOUT SCIATICA: ICD-10-CM

## 2021-06-30 DIAGNOSIS — N39.46 MIXED INCONTINENCE: ICD-10-CM

## 2021-06-30 DIAGNOSIS — G89.29 CHRONIC BILATERAL LOW BACK PAIN WITHOUT SCIATICA: ICD-10-CM

## 2021-06-30 PROCEDURE — 97140 MANUAL THERAPY 1/> REGIONS: CPT | Performed by: PHYSICAL THERAPIST

## 2021-06-30 PROCEDURE — 97014 ELECTRIC STIMULATION THERAPY: CPT | Performed by: PHYSICAL THERAPIST

## 2021-06-30 PROCEDURE — 97530 THERAPEUTIC ACTIVITIES: CPT | Performed by: PHYSICAL THERAPIST

## 2021-06-30 NOTE — PROGRESS NOTES
Physical Therapy Daily Progress Note      Patient: Anjali Silva   : 1985  Referring practitioner: Usha Cardoso*  Date of Initial Visit: Type: THERAPY  Noted: 5/10/2021  Today's Date: 2021  Patient seen for 14 sessions    Progress Note Due: 2021     Anjali Silva reports: that she recently had an episode where she waited too long and peed herself. Started a new workout program yesterday. Feels general soreness in the back, but occasionally moves a certain way and gets sharp shooting pain in the back of her pelvis.     Current Pain: 5/10  Worst Pain: 10/10 (squatted down, bent over to get something in low cabinet in R SIJ)    PFDI-20:  POPDI-6:   CRAD-8:   EMERY-6:     Oswestry:       Objective:  Left   Tenderness of the erector spinae, gluteus guera, and quadratus lumborum.      Right   Tenderness of the erector spinae, gluteus guera, and quadratus lumborum.        Additional Palpation Details  Moderate tenderness: Left Superficial transverse perineal, bilateral obturator internus  Mild tenderness: Left ischiocavernosus      Tenderness      Lumbar Spine  Tenderness in the facet joint.      Left Hip   Tenderness in the PSIS and sacroiliac joint. L>R     Right Hip   Tenderness in the PSIS and sacroiliac joint. L>R    Additional Tenderness Details  Mild tenderness: R L4-5, L L1-2    Functional Assessment:  Decreased functional stability of left lower extremity with single leg stance  Anterior tilt with deep squat         Additional Strength Details  The patient verbally provided ongoing and enthusiastic consent for the internal pelvic floor assessment and treatment conducting during today's physical therapy session.  Pelvic Floor Muscle Strength: 3/5  Pelvic Floor Muscle Endurance: 15 seconds  Pelvic Floor Muscle Power: 7 contractions in 10 seconds  Pelvic Floor Relaxation: Full         Education: Progress towards goals, carrying mechanics, timed voids         Short  Term Goals: 4 weeks  1. The patient will be compliant with home exercise program with minimal verbal and/or tactile cues. -achieved   2. The patient will deny restriction with lifting/carrying light objects at work such as shampoo. -achieved   3. The patient will be independent with appropriate defecation pattern and be able to elongate the pelvic floor fully. -achieved    Long Term Goals: 12 weeks   1. The patient will be independent with long term home exercise program. -progressing  2. The patient will perform prolonged standing during a work shift with < 2/10 low back and hip pain. -progressing 7/10  3. The patient will deny pelvic pressure during recreational resistance training and yoga. -progressing, only sporadic  4. The patient will report a 75% decrease in episodes of incontinence.  -progressing, although recent episode of incontinence due to prolonged holding   5. Pelvic Floor Impact Questionnaire items to decrease to < 50 to return to physical activities, social activities and household chores without limitation. -progressing, nearly achieved     New Goals: To be achieved at discharge  6. The patient will improve EMERY-6 score from 17/24 to 12/24 in order to reduce episodes of urinary incontinence while performing ADLs.   7. The patient will demonstrate appropriate lifting and carrying mechanics of up to 40 pounds to perform work tasks.   8. The patient will improve Oswestry score from 19/50 to 10/50 in order to decrease back pain with heavy lifting, sitting, and personal care.           Assessment: The patient has attended 14 sessions of skilled physical therapy thus far. Since the time of initial evaluation, she reports less pain with lifting light objects at work and reports only sporadic pelvic pressure during recreational exercise. Back pain following a work shift is trending downwards. She continues to exhibit tenderness within the pelvic floor (primarily left side) as well as tenderness of bilateral  sacroiliac joints. She exhibits decreased activation of the abdominals in functional movements such as squatting and weakness of the lower extremities. This is likely contributing to continued complaints of back pain with heavy lifting, carrying heavy objects, prolonged standing, sitting and personal care tasks. She would benefit from additional skilled physical therapy in order to address the stated deficits and return to her previous level of function.         Plan:  Progress strengthening /stabilization /functional activity             Timed:         Manual Therapy:  25       mins  97449;     Therapeutic Exercise:   3      mins  09654;     Neuromuscular Carolina:        mins  29520;    Therapeutic Activity:    17      mins  62421;     Gait Training:           mins  08320;     Ultrasound:          mins  38245;    Ionto                                   mins   22159  Self Care                            mins   32042      Un-Timed:  Electrical Stimulation:   15      mins  68532 ( );  Dry Needling          mins self-pay  Traction          mins 83559  Low Eval          Mins  99236  Mod Eval          Mins  75922  High Eval                            Mins  21296  Canalith Repos                   mins  95618    Timed Treatment:  60    mins   Total Treatment:    63    mins    Janna Jorge, PT  Physical Therapist

## 2021-07-09 ENCOUNTER — TREATMENT (OUTPATIENT)
Dept: PHYSICAL THERAPY | Facility: CLINIC | Age: 36
End: 2021-07-09

## 2021-07-09 DIAGNOSIS — N39.46 MIXED INCONTINENCE: ICD-10-CM

## 2021-07-09 DIAGNOSIS — M79.18 MYALGIA OF PELVIC FLOOR: Primary | ICD-10-CM

## 2021-07-09 DIAGNOSIS — M25.559 PAIN, HIP: ICD-10-CM

## 2021-07-09 DIAGNOSIS — M54.50 CHRONIC BILATERAL LOW BACK PAIN WITHOUT SCIATICA: ICD-10-CM

## 2021-07-09 DIAGNOSIS — G89.29 CHRONIC BILATERAL LOW BACK PAIN WITHOUT SCIATICA: ICD-10-CM

## 2021-07-09 PROCEDURE — 97032 APPL MODALITY 1+ESTIM EA 15: CPT | Performed by: PHYSICAL THERAPIST

## 2021-07-09 PROCEDURE — 97530 THERAPEUTIC ACTIVITIES: CPT | Performed by: PHYSICAL THERAPIST

## 2021-07-09 PROCEDURE — 97140 MANUAL THERAPY 1/> REGIONS: CPT | Performed by: PHYSICAL THERAPIST

## 2021-07-09 PROCEDURE — 97110 THERAPEUTIC EXERCISES: CPT | Performed by: PHYSICAL THERAPIST

## 2021-07-09 NOTE — PROGRESS NOTES
Physical Therapy Daily Progress Note      Patient: Anjali Silva   : 1985  Referring practitioner: Usha Cardoso*  Date of Initial Visit: Type: THERAPY  Noted: 5/10/2021  Today's Date: 2021  Patient seen for 15 sessions    Progress Note Due: 2021     Anjali Silva reports: that her back has been pretty good. On vacation this week. Felt a sharp pain yesterday, but states that she's been working out more and that it might be muscle.       Objective/ Treatment:  Decreased mobility of L SIJ compared to R SIJ. Trialed prolonged lumbar flexion position for 5 minutes to determine if lumbar flexion relieves back pain. 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 Good control noted with step up and drive with 15 lbs with minimal verbal cues. See manual therapy and exercise logs for more details.       Education: Purpose of prolonged positioning testing, plan of care, updated stretches       Assessment: The patient continues to exhibit decreased lumbar pain while in a posterior pelvic tilt. Decreased lumbopelvic mobility make that position difficult to achieve for prolonged periods of time. Plan to continue working on improving lumbopelvic mobility and stability in dynamic standing.         Plan:  Progress strengthening /stabilization /functional activity             Timed:         Manual Therapy:   20      mins  81422;     Therapeutic Exercise:   8      mins  20191;     Neuromuscular Carolina:        mins  73526;    Therapeutic Activity:    12      mins  94989;     Gait Training:           mins  46075;     Ultrasound:          mins  33784;    Ionto                                   mins   50439  Self Care                            mins   89604      Un-Timed:  Electrical Stimulation:  20       mins  32202 ( );  Dry Needling          mins self-pay  Traction          mins 95840  Low Eval          Mins   43611  Mod Eval          Mins  45689  High Eval                            Mins  89724  Canalith Repos                   mins  40061    Timed Treatment:  60    mins   Total Treatment:    64    mins    Janna Jorge, PT  Physical Therapist

## 2021-07-12 ENCOUNTER — TREATMENT (OUTPATIENT)
Dept: PHYSICAL THERAPY | Facility: CLINIC | Age: 36
End: 2021-07-12

## 2021-07-12 DIAGNOSIS — N39.46 MIXED INCONTINENCE: ICD-10-CM

## 2021-07-12 DIAGNOSIS — M25.559 PAIN, HIP: ICD-10-CM

## 2021-07-12 DIAGNOSIS — M79.18 MYALGIA OF PELVIC FLOOR: Primary | ICD-10-CM

## 2021-07-12 DIAGNOSIS — M54.50 CHRONIC BILATERAL LOW BACK PAIN WITHOUT SCIATICA: ICD-10-CM

## 2021-07-12 DIAGNOSIS — G89.29 CHRONIC BILATERAL LOW BACK PAIN WITHOUT SCIATICA: ICD-10-CM

## 2021-07-12 PROCEDURE — 97032 APPL MODALITY 1+ESTIM EA 15: CPT | Performed by: PHYSICAL THERAPIST

## 2021-07-12 PROCEDURE — 97530 THERAPEUTIC ACTIVITIES: CPT | Performed by: PHYSICAL THERAPIST

## 2021-07-12 PROCEDURE — 97140 MANUAL THERAPY 1/> REGIONS: CPT | Performed by: PHYSICAL THERAPIST

## 2021-07-12 NOTE — PROGRESS NOTES
Physical Therapy Daily Progress Note      Patient: Anjali Silva   : 1985  Referring practitioner: Usha Cardoso*  Date of Initial Visit: Type: THERAPY  Noted: 5/10/2021  Today's Date: 2021  Patient seen for 16 sessions    Progress Note Due: 2021     Anjali Silva reports: that she's been feeling pretty good. No sharp shooting pains. States that she's been able to work out more with less pain. States that several months ago she would've given up on working out this much because of pain.      Objective/ Treatment:  Muscle guarding present throughout L>R QL and erector spinae. Tenderness of bilateral gluteals, primarily along iliac crests. Minimal verbal and tactile cues to maintain upright trunk posture and control during dynamic strengthening exercises. See manual therapy and exercise logs for more details.     Education: Plan of care       Assessment: The patient continues to make good progress towards her goals. Her neuromuscular control in standing is improving, although she benefits from verbal and tactile cuing to maintain posterior pelvic tilt with dynamic activities. She reports improved independence and less pain with recreational exercise. Plan to continue resistance training with dynamic standing activities.         Plan:  Progress strengthening /stabilization /functional activity             Timed:         Manual Therapy:  15       mins  54483;     Therapeutic Exercise:         mins  15019;     Neuromuscular Carolina:        mins  79149;    Therapeutic Activity:    23      mins  43333;     Gait Training:           mins  27308;     Ultrasound:          mins  62263;    Ionto                                   mins   07691  Self Care                            mins   26089      Un-Timed:  Electrical Stimulation:  20       mins  07750 ( );  Dry Needling          mins self-pay  Traction          mins 75612  Low Eval          Mins  98162  Mod Eval          Mins  25485  High Eval                             Mins  88908  [facility]                   mins  73427    Timed Treatment:  58    mins   Total Treatment:    60    mins    Janna Jorge, PT  Physical Therapist

## 2021-07-14 ENCOUNTER — TREATMENT (OUTPATIENT)
Dept: PHYSICAL THERAPY | Facility: CLINIC | Age: 36
End: 2021-07-14

## 2021-07-14 DIAGNOSIS — M79.18 MYALGIA OF PELVIC FLOOR: Primary | ICD-10-CM

## 2021-07-14 DIAGNOSIS — G89.29 CHRONIC BILATERAL LOW BACK PAIN WITHOUT SCIATICA: ICD-10-CM

## 2021-07-14 DIAGNOSIS — M54.50 CHRONIC BILATERAL LOW BACK PAIN WITHOUT SCIATICA: ICD-10-CM

## 2021-07-14 DIAGNOSIS — M25.559 PAIN, HIP: ICD-10-CM

## 2021-07-14 DIAGNOSIS — N39.46 MIXED INCONTINENCE: ICD-10-CM

## 2021-07-14 PROCEDURE — 97032 APPL MODALITY 1+ESTIM EA 15: CPT | Performed by: PHYSICAL THERAPIST

## 2021-07-14 PROCEDURE — 97140 MANUAL THERAPY 1/> REGIONS: CPT | Performed by: PHYSICAL THERAPIST

## 2021-07-14 PROCEDURE — 97530 THERAPEUTIC ACTIVITIES: CPT | Performed by: PHYSICAL THERAPIST

## 2021-07-14 NOTE — PROGRESS NOTES
"Physical Therapy Daily Progress Note      Patient: Anjali Silva   : 1985  Referring practitioner: Usha Cardoso*  Date of Initial Visit: Type: THERAPY  Noted: 5/10/2021  Today's Date: 2021  Patient seen for 17 sessions    Progress Note Due: 2021     Anjali Silva reports: that she's sore after last session. No episodes of incontinence for several weeks. Exercising more with less pain, but back is still sore after standing at work.        Current Pain: 4/10  Worst Pain: 8/10 (left low back while lifting/carrying - sharp, instant pain that gets \"stuck\" and then dissipates. Lasts 1-2 minutes)     Objective/ Treatment:    Lumbar Spine  Tenderness in the facet joint.      Left Hip   Tenderness in the PSIS and sacroiliac joint. L>R     Right Hip   Tenderness in the PSIS and sacroiliac joint. L>R    Additional Tenderness Details  Mild tenderness: R L4-5, L L1-2     Functional Assessment:  Decreased functional stability of left lower extremity with single leg stance. Decreased trunk rotation - moves through pelvis   Patient lifts primarily using trunk extensors as primary         Additional Strength Details  The patient verbally provided ongoing and enthusiastic consent for the internal pelvic floor assessment and treatment conducting during today's physical therapy session.  Pelvic Floor Muscle Strength: 3/5  Pelvic Floor Muscle Endurance: 20 seconds  Pelvic Floor Muscle Power: 6 contractions in 10 seconds  Pelvic Floor Relaxation: Full initially, but delayed following prolonged hold        Short Term Goals: 4 weeks  1. The patient will be compliant with home exercise program with minimal verbal and/or tactile cues. -achieved   2. The patient will deny restriction with lifting/carrying light objects at work such as shampoo. -achieved   3. The patient will be independent with appropriate defecation pattern and be able to elongate the pelvic floor fully. -achieved    Long Term Goals: 12 " weeks   1. The patient will be independent with long term home exercise program. -progressing  2. The patient will perform prolonged standing during a work shift with < 2/10 low back and hip pain. -progressing 7/10  3. The patient will deny pelvic pressure during recreational resistance training and yoga. -achieved  4. The patient will report a 75% decrease in episodes of incontinence.  -progressing, no reports of incontinence for several weeks   5. Pelvic Floor Impact Questionnaire items to decrease to < 50 to return to physical activities, social activities and household chores without limitation. -progressing, nearly achieved      New Goals: To be achieved at discharge   6. The patient will improve EMERY-6 score from 17/24 to 12/24 in order to reduce episodes of urinary incontinence while performing ADLs. -not assessed today  7. The patient will demonstrate appropriate lifting and carrying mechanics of up to 40 pounds to perform work tasks. -progressing   8. The patient will improve Oswestry score from 19/50 to 10/50 in order to decrease back pain with heavy lifting, sitting, and personal care. -not assessed today           Education: Progress towards goals, plan of care, split stance while doing activities in standing, extensive education on lifting mechanics       Assessment: The patient has attended 17 sessions of skilled physical therapy thus far. Since beginning physical therapy, the patient has been able to carry light objects at work, participate in recreational exercise and defecate without discomfort or restriction. Neuromuscular control is slowly beginning to improve, with the largest amount of deficits in dynamic standing. Hip fleixibility and strength also remain slightly limited. This is contributing to pain with prolonged standing and heavy lifting, as well as reports of urinary incontinence with standing tasks, although she has not had an episode of incontinence in several weeks. She would benefit  from additional skilled physical therapy in order to address the stated deficits and return to her previous level of function.         Plan:  Progress strengthening /stabilization /functional activity, 12 more sesions             Timed:         Manual Therapy:   18      mins  39425;     Therapeutic Exercise:         mins  10867;     Neuromuscular Carolina:        mins  07270;    Therapeutic Activity:    20      mins  79090;     Gait Training:           mins  22004;     Ultrasound:          mins  98591;    Ionto                                   mins   42080  Self Care                            mins   32125      Un-Timed:  Electrical Stimulation:   20      mins  38822 ( );  Dry Needling          mins self-pay  Traction          mins 21866  Low Eval          Mins  05471  Mod Eval          Mins  86540  High Eval                            Mins  07992  Canalith Repos                   mins  96947    Timed Treatment:  58    mins   Total Treatment:    60    mins    Janna Jorge PT  Physical Therapist

## 2021-07-21 ENCOUNTER — TREATMENT (OUTPATIENT)
Dept: PHYSICAL THERAPY | Facility: CLINIC | Age: 36
End: 2021-07-21

## 2021-07-21 DIAGNOSIS — M79.18 MYALGIA OF PELVIC FLOOR: Primary | ICD-10-CM

## 2021-07-21 DIAGNOSIS — N39.46 MIXED INCONTINENCE: ICD-10-CM

## 2021-07-21 DIAGNOSIS — M25.559 PAIN, HIP: ICD-10-CM

## 2021-07-21 DIAGNOSIS — G89.29 CHRONIC BILATERAL LOW BACK PAIN WITHOUT SCIATICA: ICD-10-CM

## 2021-07-21 DIAGNOSIS — M54.50 CHRONIC BILATERAL LOW BACK PAIN WITHOUT SCIATICA: ICD-10-CM

## 2021-07-21 PROCEDURE — 97140 MANUAL THERAPY 1/> REGIONS: CPT | Performed by: PHYSICAL THERAPIST

## 2021-07-21 PROCEDURE — 97032 APPL MODALITY 1+ESTIM EA 15: CPT | Performed by: PHYSICAL THERAPIST

## 2021-07-21 PROCEDURE — 97530 THERAPEUTIC ACTIVITIES: CPT | Performed by: PHYSICAL THERAPIST

## 2021-07-21 NOTE — PROGRESS NOTES
Physical Therapy Daily Progress Note      Patient: Anjali Silva   : 1985  Referring practitioner: Usha Cardoso*  Date of Initial Visit: Type: THERAPY  Noted: 5/10/2021  Today's Date: 2021  Patient seen for 18 sessions    Progress Note Due: 2021     Anjali Silva reports: that she has been running more. Had one instance since last session where she moved and felt a tingling pain down her left leg.       Objective/ Treatment:  The quadratus lumborum is irritable bilaterally. + left slump and crossed SLR. Slightly decreased control of left lower extremity with dynamic single leg activities. Verbal cues to maintain posterior pelvic tilt and upright trunk position. See manual therapy and exercise logs for more details.     Education: Educated on nerve irritation       Assessment: Upon assessment today, the patient exhibits left sciatic nerve irritability. Sciatic pain was 0/10 with exercise today. 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 Patient improving neuromuscular control of heavy lifting. Plan to progress repetitions and endurance in next sessions, as patient reports that global back soreness after a long day at work is still biggest complaint.         Plan:  Progress strengthening /stabilization /functional activity             Timed:         Manual Therapy:   11      mins  66476;     Therapeutic Exercise:         mins  29189;     Neuromuscular Carolina:  5      mins  91699;    Therapeutic Activity:   30       mins  00513;     Gait Training:           mins  94398;     Ultrasound:          mins  08061;    Ionto                                   mins   00759  Self Care                            mins   29849      Un-Timed:  Electrical Stimulation:  20       mins  85407 ( );  Dry Needling          mins self-pay  Traction          mins 26260  Low Eval          Mins  33651  Mod Eval          Mins  96109  High Eval                            Mins   90676  [facility]                   mins  52809    Timed Treatment:  66    mins   Total Treatment:    68    mins    Janna Jorge, PT  Physical Therapist

## 2021-07-26 ENCOUNTER — TELEPHONE (OUTPATIENT)
Dept: PHYSICAL THERAPY | Facility: CLINIC | Age: 36
End: 2021-07-26

## 2021-07-28 ENCOUNTER — TREATMENT (OUTPATIENT)
Dept: PHYSICAL THERAPY | Facility: CLINIC | Age: 36
End: 2021-07-28

## 2021-07-28 DIAGNOSIS — M54.50 CHRONIC BILATERAL LOW BACK PAIN WITHOUT SCIATICA: ICD-10-CM

## 2021-07-28 DIAGNOSIS — N39.46 MIXED INCONTINENCE: ICD-10-CM

## 2021-07-28 DIAGNOSIS — M79.18 MYALGIA OF PELVIC FLOOR: Primary | ICD-10-CM

## 2021-07-28 DIAGNOSIS — M25.559 PAIN, HIP: ICD-10-CM

## 2021-07-28 DIAGNOSIS — G89.29 CHRONIC BILATERAL LOW BACK PAIN WITHOUT SCIATICA: ICD-10-CM

## 2021-07-28 PROCEDURE — 97140 MANUAL THERAPY 1/> REGIONS: CPT | Performed by: PHYSICAL THERAPIST

## 2021-07-28 PROCEDURE — 97530 THERAPEUTIC ACTIVITIES: CPT | Performed by: PHYSICAL THERAPIST

## 2021-07-28 PROCEDURE — 97032 APPL MODALITY 1+ESTIM EA 15: CPT | Performed by: PHYSICAL THERAPIST

## 2021-07-28 NOTE — PROGRESS NOTES
Physical Therapy Daily Progress Note      Patient: Anjali Silva   : 1985  Referring practitioner: Usha Cardoso*  Date of Initial Visit: Type: THERAPY  Noted: 5/10/2021  Today's Date: 2021  Patient seen for 19 sessions    Progress Note Due: 2021     Anjali Silva reports: that today mid back is very painful. Leaning forward is what produces the sharp shooting pains in the low back when she's standing.       Objective/ Treatment:  Patient exhibiting moderate tenderness of B QL. Performed running assessment today between 3.0mph (warm up) and 5.0 mph which revealed slight external rotation of bilateral tibia and hips L>R and anterior pelvic tilt. Gait slightly asymmetrical favoring R LE. Mild pronation bilaterally. Manual cues to correct pelvic tilt and shorten stride length. Patient stating that she run/walks at around 128 spm andrez. See manual therapy and exercise logs for more details.     Education: Updated home exercise program       Assessment: Running assessment today revealed notable deficits, primarily excessive anterior pelvic tilt and increased stride length/slightly asymmetrical gait pattern. Performed exercises today at 80 bpm metronome to improve running form and worked on deceleration exercises.         Plan:  Progress strengthening /stabilization /functional activity             Timed:         Manual Therapy:   12      mins  67803;     Therapeutic Exercise:         mins  75320;     Neuromuscular Carolina:        mins  29449;    Therapeutic Activity:    33      mins  49271;     Gait Training:           mins  33551;     Ultrasound:          mins  79178;    Ionto                                   mins   52707  Self Care                            mins   96961      Un-Timed:  Electrical Stimulation:   20      mins  13389 ( );  Dry Needling          mins self-pay  Traction          mins 33538  Low Eval          Mins  53042  Mod Eval          Mins  53834  High Eval                             Mins  69314  [facility]                   mins  26491    Timed Treatment:  65    mins   Total Treatment:    69    mins    Janna Jorge, PT  Physical Therapist

## 2021-08-09 ENCOUNTER — TREATMENT (OUTPATIENT)
Dept: PHYSICAL THERAPY | Facility: CLINIC | Age: 36
End: 2021-08-09

## 2021-08-09 DIAGNOSIS — N39.46 MIXED INCONTINENCE: ICD-10-CM

## 2021-08-09 DIAGNOSIS — M25.559 PAIN, HIP: ICD-10-CM

## 2021-08-09 DIAGNOSIS — G89.29 CHRONIC BILATERAL LOW BACK PAIN WITHOUT SCIATICA: ICD-10-CM

## 2021-08-09 DIAGNOSIS — M79.18 MYALGIA OF PELVIC FLOOR: Primary | ICD-10-CM

## 2021-08-09 DIAGNOSIS — M54.50 CHRONIC BILATERAL LOW BACK PAIN WITHOUT SCIATICA: ICD-10-CM

## 2021-08-09 PROCEDURE — 97530 THERAPEUTIC ACTIVITIES: CPT | Performed by: PHYSICAL THERAPIST

## 2021-08-09 PROCEDURE — 97140 MANUAL THERAPY 1/> REGIONS: CPT | Performed by: PHYSICAL THERAPIST

## 2021-08-09 PROCEDURE — 97032 APPL MODALITY 1+ESTIM EA 15: CPT | Performed by: PHYSICAL THERAPIST

## 2021-08-09 NOTE — PROGRESS NOTES
Re-Assessment / Re-Certification    Patient: Anjali Silva   : 1985  Diagnosis/ICD-10 Code:  Myalgia of pelvic floor [M79.18]  Referring practitioner: Usha Cardoso*  Date of Initial Visit: Type: THERAPY  Noted: 5/10/2021  Today's Date: 2021  Patient seen for 20 sessions    Progress Note Due:   Subjective:   Anjali Silva reports: that her hips were killing her last week. Feels like it was her psoas. Has been doing more running and needs new shoes. Didn't have assistant last week and had to do more at work. No episodes of incontinence but does feel like it's harder to empty her bladder. Notes some straining and increased frequency of urination. Was feeling great before last week but pain problems worsened without being in therapy last week. Overall, feels like she is significantly improved. Just over did it at work last week.     Subjective Questionnaire: Pelvic Floor Impact Questionnaire (raw scores):  Bladder or Urine: 1  Bowel or Rectum: 0  Vagina or Pelvis: 2     EMERY-6:      Oswestry: 7/50       Current Pain: 0/10  Worst Pain: 10/10 (left sided low back/left anterior hip with standing) - this occurred last week only, week prior was 0/10 pain      Objective/ Treatment:  Lumbar Spine  Tenderness in the facet joint.      Left Hip   Tenderness in the PSIS and sacroiliac joint. L>R     Right Hip   Tenderness in the PSIS and sacroiliac joint. L>R    Additional Tenderness Details  Mild tenderness: R L4-5, L L1-2    Lower Extremity Strength:  Right glute max: 4+/5  Left glute max: 4/5     Functional Assessment:  Decreased functional stability of left lower extremity with single leg stance.   Decreased trunk rotation - moves through pelvis, but improves with verbal and tactile cues.  Lifting form - good up to 35 lbs.    Education: Progress towards goals, causes of increased lumbosacral pain last week, plan of care, updated HEP     Clinical Progress: improved  Home Program Compliance:  Yes  Treatment has included: therapeutic exercise, neuromuscular re-education, manual therapy, therapeutic activity, moist heat and cryotherapy       Assessment & Plan     Assessment  Impairments: activity intolerance, impaired physical strength and pain with function  Assessment details: The patient has attended 20 sessions of skilled physical therapy thus far. Since beginning physical therapy treatment, she exhibits improvements in neuromuscular control with dynamic standing activities. She reports decreased lumbosacral pain with recreational exercise. She has met subjective outcome measure goals for the KOFFI and PFIQ-7. She continues to exhibit weakness within the abdominals and gluteals, as well as as over utilization of the spinal extensors during endurance activities such as prolonged standing at work. Prior to last week the patient was reporting 0/10 pain. Following a busy week at work, her lumbosacral pain return due to continued limitations in muscular endurance and increased stress/psychoscial factors. She is progressing as expected for someone with chronic pain and is continuing to improve with therapy. She would benefit from additional skilled physical therapy in order to address the stated deficits and return to her previous level of function.   Prognosis: good  Functional Limitations: uncomfortable because of pain and standing  Plan  Therapy options: will be seen for skilled physical therapy services  Planned modality interventions: cryotherapy, dry needling, high voltage pulsed current (pain management), TENS and thermotherapy (hydrocollator packs)  Planned therapy interventions: body mechanics training, balance/weight-bearing training, flexibility, functional ROM exercises, gait training, home exercise program, IADL retraining, joint mobilization, manual therapy, neuromuscular re-education, postural training, soft tissue mobilization, spinal/joint mobilization, strengthening, stretching and  therapeutic activities  Frequency: 2x week  Duration in weeks: 6  Treatment plan discussed with: patient        Visit Diagnoses:    ICD-10-CM ICD-9-CM   1. Myalgia of pelvic floor  M79.18 729.1   2. Mixed incontinence  N39.46 788.33   3. Chronic bilateral low back pain without sciatica  M54.5 724.2    G89.29 338.29   4. Pain, hip  M25.559 719.45       Progress toward previous goals: Partially Met    Long Term Goals: 12 weeks   1. The patient will be independent with long term home exercise program. -progressing  2. The patient will perform prolonged standing during a work shift with < 2/10 low back and hip pain. -progressing, last week was bad 10/10, week prior was 0/10  3. The patient will deny pelvic pressure during recreational resistance training and yoga. -achieved  4. The patient will report a 75% decrease in episodes of incontinence.  -achieved   5. Pelvic Floor Impact Questionnaire items to decrease to < 50 to return to physical activities, social activities and household chores without limitation. -achieved      New Goals: To be achieved at discharge   6. The patient will improve EMERY-6 score from 17/24 to 12/24 in order to reduce episodes of urinary incontinence while performing ADLs. -progressing, 14/24  7. The patient will demonstrate appropriate lifting and carrying mechanics of up to 40 pounds to perform work tasks. -achieved   8. The patient will improve Oswestry score from 19/50 to 10/50 in order to decrease back pain with heavy lifting, sitting, and personal care. -achieved       Recommendations: Continue as planned  Timeframe: 6 weeks  Prognosis to achieve goals: good    PT Signature: Janna Jorge PT      Based upon review of the patient's progress and continued therapy plan, it is my medical opinion that Anjali Silva should continue physical therapy treatment at [facility] PHYS[facility] THERAPY  [address], [address]  [address]  [phone].    Signature: __________________________________  Usha Acosta APRN    Timed:  Manual Therapy:   18      mins  90622;  Therapeutic Exercise:         mins  88925;     Neuromuscular Carolian:        mins  03559;    Therapeutic Activity:    24      mins  39167;     Gait Training:           mins  76152;     Ultrasound:          mins  33324;    Electrical Stimulation:  20       mins  18379 ( );    Untimed:  Electrical Stimulation:         mins  47487 ( );  Mechanical Traction:         mins  96979;     Timed Treatment:  62    mins   Total Treatment:    65    mins

## 2021-08-11 ENCOUNTER — TREATMENT (OUTPATIENT)
Dept: PHYSICAL THERAPY | Facility: CLINIC | Age: 36
End: 2021-08-11

## 2021-08-11 DIAGNOSIS — M54.50 CHRONIC BILATERAL LOW BACK PAIN WITHOUT SCIATICA: ICD-10-CM

## 2021-08-11 DIAGNOSIS — M25.559 PAIN, HIP: ICD-10-CM

## 2021-08-11 DIAGNOSIS — M79.18 MYALGIA OF PELVIC FLOOR: Primary | ICD-10-CM

## 2021-08-11 DIAGNOSIS — N39.46 MIXED INCONTINENCE: ICD-10-CM

## 2021-08-11 DIAGNOSIS — G89.29 CHRONIC BILATERAL LOW BACK PAIN WITHOUT SCIATICA: ICD-10-CM

## 2021-08-11 PROCEDURE — 97140 MANUAL THERAPY 1/> REGIONS: CPT | Performed by: PHYSICAL THERAPIST

## 2021-08-11 PROCEDURE — 97530 THERAPEUTIC ACTIVITIES: CPT | Performed by: PHYSICAL THERAPIST

## 2021-08-11 PROCEDURE — 97032 APPL MODALITY 1+ESTIM EA 15: CPT | Performed by: PHYSICAL THERAPIST

## 2021-08-11 NOTE — PROGRESS NOTES
Physical Therapy Daily Progress Note      Patient: Anjali Silva   : 1985  Referring practitioner: Usha Cardoso*  Date of Initial Visit: Type: THERAPY  Noted: 5/10/2021  Today's Date: 2021  Patient seen for 21 sessions    Progress Note Due: 2021     Anjali Silva reports: that she has felt much better the last two days.      Objective/ Treatment:  Mild-moderate discomfort of left QL and iliopsoas. Hypomobility present of left hip, primarily into extension. Min-mod verbal cues provided for level pelvis in single leg exercises, as patient tends to externally rotate the hip as a compensatory strategies. She is able to maintain posterior pelvic tilt more easily in standing, but trunk starts to forward flex as back extensors fatigue. See manual therapy and exercise logs for more details.     Education: Progression of exercise      Assessment: Continuing to progress single leg stability and neuromuscular control in standing. The patient benefits from verbal cuing to minimize compensatory hip external rotation, but is able to self correct more easily. The left lower extremity continues to exhibit more functional weakness than the right side. Plan to continue progression dynamic standing strengthening exercises.         Plan:  Progress strengthening /stabilization /functional activity             Timed:         Manual Therapy:   14      mins  67101;     Therapeutic Exercise:         mins  63063;     Neuromuscular Carolina:        mins  42273;    Therapeutic Activity:   25       mins  17352;     Gait Training:           mins  68318;     Ultrasound:          mins  88465;    Ionto                                   mins   45229  Self Care                            mins   62280      Un-Timed:  Electrical Stimulation:   20      mins  96940 ([facility] );  Dry Needling          mins self-pay  Traction          mins 39589  Low Eval          Mins  22203  Mod Eval          Mins  92172  High Eval                             Mins  80927  [facility]                   mins  08950    Timed Treatment:  59    mins   Total Treatment:    61    mins    Janna Jorge, PT  Physical Therapist

## 2021-08-16 ENCOUNTER — TREATMENT (OUTPATIENT)
Dept: PHYSICAL THERAPY | Facility: CLINIC | Age: 36
End: 2021-08-16

## 2021-08-16 DIAGNOSIS — M79.18 MYALGIA OF PELVIC FLOOR: Primary | ICD-10-CM

## 2021-08-16 DIAGNOSIS — M25.559 PAIN, HIP: ICD-10-CM

## 2021-08-16 DIAGNOSIS — M54.50 CHRONIC BILATERAL LOW BACK PAIN WITHOUT SCIATICA: ICD-10-CM

## 2021-08-16 DIAGNOSIS — G89.29 CHRONIC BILATERAL LOW BACK PAIN WITHOUT SCIATICA: ICD-10-CM

## 2021-08-16 DIAGNOSIS — N39.46 MIXED INCONTINENCE: ICD-10-CM

## 2021-08-16 PROCEDURE — 97140 MANUAL THERAPY 1/> REGIONS: CPT | Performed by: PHYSICAL THERAPIST

## 2021-08-16 PROCEDURE — 97032 APPL MODALITY 1+ESTIM EA 15: CPT | Performed by: PHYSICAL THERAPIST

## 2021-08-16 PROCEDURE — 97530 THERAPEUTIC ACTIVITIES: CPT | Performed by: PHYSICAL THERAPIST

## 2021-08-16 NOTE — PROGRESS NOTES
Physical Therapy Daily Progress Note      Patient: Anjali Silva   : 1985  Referring practitioner: Usha Cardoso*  Date of Initial Visit: Type: THERAPY  Noted: 5/10/2021  Today's Date: 2021  Patient seen for 22 sessions    Progress Note Due: 2021     Anjali Silva reports: that her back hasn't felt too bad this weekend. Feels some discomfort bending forward but it goes away if she tilts her pelvis backwards.       Objective/ Treatment: Patient exhibiting decreased tenderness of inferior QL on the left side. The majority of the increased muscle tone is at the origin on the left side. R side is nearing normal. Verbal cues to maintain posterior pelvic tilt with dynamic tasks. See manual therapy and exercise logs for more details.     Education: Exercise progression       Assessment: The patient is continuing to progress lumbpelvic strengthening in dynamic standing. Initiated strengthening on unstable surfaces today. The patient does exhibit difficulty with this, but requires less cuing than she did previously. Low back pain is gradually decreasing.         Plan:  Progress strengthening /stabilization /functional activity             Timed:         Manual Therapy:  15       mins  67892;     Therapeutic Exercise:         mins  93532;     Neuromuscular Carolina:        mins  47438;    Therapeutic Activity:    27      mins  57797;     Gait Training:           mins  72908;     Ultrasound:          mins  86531;    Ionto                                   mins   26847  Self Care                            mins   04881      Un-Timed:  Electrical Stimulation:  20       mins  11036 ( );  Dry Needling          mins self-pay  Traction          mins 13385  Low Eval          Mins  93038  Mod Eval          Mins  44446  High Eval                            Mins  78095  Canalith Repos                   mins  89730    Timed Treatment:  62    mins   Total Treatment:    65    mins    Janna Jorge  PT  Physical Therapist

## 2021-08-18 ENCOUNTER — TREATMENT (OUTPATIENT)
Dept: PHYSICAL THERAPY | Facility: CLINIC | Age: 36
End: 2021-08-18

## 2021-08-18 DIAGNOSIS — M54.50 CHRONIC BILATERAL LOW BACK PAIN WITHOUT SCIATICA: ICD-10-CM

## 2021-08-18 DIAGNOSIS — G89.29 CHRONIC BILATERAL LOW BACK PAIN WITHOUT SCIATICA: ICD-10-CM

## 2021-08-18 DIAGNOSIS — M79.18 MYALGIA OF PELVIC FLOOR: Primary | ICD-10-CM

## 2021-08-18 DIAGNOSIS — M25.559 PAIN, HIP: ICD-10-CM

## 2021-08-18 DIAGNOSIS — N39.46 MIXED INCONTINENCE: ICD-10-CM

## 2021-08-18 PROCEDURE — 97032 APPL MODALITY 1+ESTIM EA 15: CPT | Performed by: PHYSICAL THERAPIST

## 2021-08-18 PROCEDURE — 97530 THERAPEUTIC ACTIVITIES: CPT | Performed by: PHYSICAL THERAPIST

## 2021-08-18 PROCEDURE — 97140 MANUAL THERAPY 1/> REGIONS: CPT | Performed by: PHYSICAL THERAPIST

## 2021-08-18 NOTE — PROGRESS NOTES
Physical Therapy Daily Progress Note      Patient: Anjali Silva   : 1985  Referring practitioner: No ref. provider found  Date of Initial Visit: Type: THERAPY  Noted: 5/10/2021  Today's Date: 2021  Patient seen for 23 sessions    Progress Note Due: 2021     Anjali Silva reports: that she started yoga back again. In class was doing bridging with the block and had a sharp shooting pain in her back that happens when she also bends down. States that she was not thinking about holding her tilt at the time.      Objective/ Treatment: Patient exhibiting decreased tenderness of inferior QL on the left side. The majority of the increased muscle tone is still at the origin on the left side, although muscle guarding in this region has much improved. Patient requiring verbal cues for sequencing of higher level single limb activities. See manual therapy and exercise logs for more details.     Education: Exercise progression, plan of care       Assessment: Progressed exercises today by removing upper extremity support with single leg strengthening. Increased andrez of strengthening exercises to 90 bpm, compared to 80 bpm which was used previously. Plan to continue single leg stabilization exercises on unstable surfaces.         Plan:  Progress strengthening /stabilization /functional activity             Timed:         Manual Therapy:  14       mins  69922;     Therapeutic Exercise:         mins  07170;     Neuromuscular Carolina:        mins  34161;    Therapeutic Activity:   24       mins  50370;     Gait Training:           mins  87743;     Ultrasound:          mins  11920;    Ionto                                   mins   18644  Self Care                            mins   53852      Un-Timed:  Electrical Stimulation:   20      mins  62732 ( );  Dry Needling          mins self-pay  Traction          mins 49678  Low Eval          Mins  04315  Mod Eval          Mins  72826  High Eval                             Mins  11544  [facility]                   mins  02409    Timed Treatment:  58    mins   Total Treatment:    60    mins    Janna Jorge, PT  Physical Therapist

## 2021-08-23 ENCOUNTER — TELEPHONE (OUTPATIENT)
Dept: PHYSICAL THERAPY | Facility: CLINIC | Age: 36
End: 2021-08-23